# Patient Record
Sex: FEMALE
[De-identification: names, ages, dates, MRNs, and addresses within clinical notes are randomized per-mention and may not be internally consistent; named-entity substitution may affect disease eponyms.]

---

## 2018-07-21 ENCOUNTER — TRANSCRIPTION ENCOUNTER (OUTPATIENT)
Age: 43
End: 2018-07-21

## 2022-04-12 PROBLEM — Z00.00 ENCOUNTER FOR PREVENTIVE HEALTH EXAMINATION: Status: ACTIVE | Noted: 2022-04-12

## 2022-04-13 ENCOUNTER — NON-APPOINTMENT (OUTPATIENT)
Age: 47
End: 2022-04-13

## 2022-04-14 ENCOUNTER — APPOINTMENT (OUTPATIENT)
Dept: RHEUMATOLOGY | Facility: CLINIC | Age: 47
End: 2022-04-14
Payer: COMMERCIAL

## 2022-04-14 VITALS
WEIGHT: 109 LBS | HEART RATE: 55 BPM | TEMPERATURE: 95.8 F | DIASTOLIC BLOOD PRESSURE: 68 MMHG | SYSTOLIC BLOOD PRESSURE: 106 MMHG | OXYGEN SATURATION: 100 % | BODY MASS INDEX: 18.61 KG/M2 | HEIGHT: 64 IN

## 2022-04-14 DIAGNOSIS — M79.671 PAIN IN RIGHT FOOT: ICD-10-CM

## 2022-04-14 PROCEDURE — 36415 COLL VENOUS BLD VENIPUNCTURE: CPT

## 2022-04-14 PROCEDURE — 99203 OFFICE O/P NEW LOW 30 MIN: CPT | Mod: 25

## 2022-04-25 ENCOUNTER — NON-APPOINTMENT (OUTPATIENT)
Age: 47
End: 2022-04-25

## 2022-04-25 LAB
ALBUMIN SERPL ELPH-MCNC: 4.7 G/DL
ALP BLD-CCNC: 41 U/L
ALT SERPL-CCNC: 9 U/L
ANACR T: NEGATIVE
ANION GAP SERPL CALC-SCNC: 12 MMOL/L
AST SERPL-CCNC: 17 U/L
BASOPHILS # BLD AUTO: 0.03 K/UL
BASOPHILS NFR BLD AUTO: 1 %
BILIRUB SERPL-MCNC: 0.5 MG/DL
BUN SERPL-MCNC: 7 MG/DL
CALCIUM SERPL-MCNC: 9.2 MG/DL
CCP AB SER IA-ACNC: <8 UNITS
CHLORIDE SERPL-SCNC: 103 MMOL/L
CO2 SERPL-SCNC: 24 MMOL/L
CREAT SERPL-MCNC: 0.61 MG/DL
CRP SERPL-MCNC: <3 MG/L
EGFR: 111 ML/MIN/1.73M2
EOSINOPHIL # BLD AUTO: 0.01 K/UL
EOSINOPHIL NFR BLD AUTO: 0.3 %
ERYTHROCYTE [SEDIMENTATION RATE] IN BLOOD BY WESTERGREN METHOD: 2 MM/HR
GLUCOSE SERPL-MCNC: 110 MG/DL
HCT VFR BLD CALC: 35.6 %
HGB BLD-MCNC: 12 G/DL
HLA-B27 RELATED AG QL: NEGATIVE
IMM GRANULOCYTES NFR BLD AUTO: 0 %
LYMPHOCYTES # BLD AUTO: 1.06 K/UL
LYMPHOCYTES NFR BLD AUTO: 34.8 %
MAN DIFF?: NORMAL
MCHC RBC-ENTMCNC: 33.7 GM/DL
MCHC RBC-ENTMCNC: 34.5 PG
MCV RBC AUTO: 102.3 FL
MONOCYTES # BLD AUTO: 0.24 K/UL
MONOCYTES NFR BLD AUTO: 7.9 %
NEUTROPHILS # BLD AUTO: 1.71 K/UL
NEUTROPHILS NFR BLD AUTO: 56 %
PLATELET # BLD AUTO: 153 K/UL
POTASSIUM SERPL-SCNC: 4.9 MMOL/L
PROT SERPL-MCNC: 6.2 G/DL
RBC # BLD: 3.48 M/UL
RBC # FLD: 11.7 %
RF+CCP IGG SER-IMP: NEGATIVE
RHEUMATOID FACT SER QL: 14 IU/ML
SODIUM SERPL-SCNC: 139 MMOL/L
WBC # FLD AUTO: 3.05 K/UL

## 2022-04-25 NOTE — DATA REVIEWED
[FreeTextEntry1] : MRI reviewed:\par March 2022\par nondisplaced traumatic fracture in the 5th distal phalanx\par small ankle, subtalar, and talonavicular joint effusion\par small first MTP joint effusion, trace third and fourth MTP joint effusions\par Intermetatarsal bursitis of the first, second, and third webspaces

## 2022-04-25 NOTE — PHYSICAL EXAM
[Sclera] : the sclera and conjunctiva were normal [] : no respiratory distress [Respiration, Rhythm And Depth] : normal respiratory rhythm and effort [Exaggerated Use Of Accessory Muscles For Inspiration] : no accessory muscle use [Edema] : there was no peripheral edema [No Spinal Tenderness] : no spinal tenderness [Abnormal Walk] : normal gait [Nail Clubbing] : no clubbing  or cyanosis of the fingernails [Musculoskeletal - Swelling] : no joint swelling seen [Motor Tone] : muscle strength and tone were normal [Oriented To Time, Place, And Person] : oriented to person, place, and time [Impaired Insight] : insight and judgment were intact [Affect] : the affect was normal [FreeTextEntry1] : Pain with ROM of the right shoulder but with preserved ROM.

## 2022-04-25 NOTE — REVIEW OF SYSTEMS
[Joint Pain] : joint pain [Negative] : Heme/Lymph [FreeTextEntry9] : right arch of foot, right shoulder

## 2022-04-25 NOTE — ASSESSMENT
[FreeTextEntry1] : 46 year old woman referred for rheumatology evaluation.  Patient with chronic right foot pain, not improved with decrease in exercise or activity, not improved with course of steroids previously.  Will obtain labs today to evaluate for an underlying inflammatory arthritis, will check CBC, CMP, ESR, CRP, RF, CCP, HLA B27, and JOSÉ with reflex to serologies . Would recommend a course of antiinflammatories, aleve 440 mg bid for two weeks, follow up with podiatry, further management pending results.

## 2022-04-25 NOTE — HISTORY OF PRESENT ILLNESS
[FreeTextEntry1] : 46 year old woman referred for rheumatology evaluation\par \par Patient had fall on shoulder, had RTC surgery\par \par Pain in the foot returned\par Went to podiatrist, was prescribed orthotics which is not helping\par At the present, has not run in two years due to pain in the feet\par \par Arch of the foot hurts\par Getting out of bed is painful\par Wearing a brace which helps a little bit\par Ibuprofen helps, but tries not to take it, took for a week, 600 mg tid but took twice per day\par Helped a little but did not resolve\par Stopped ibuprofen last week \par Since stopping, feels the same\par Nothing make the pain better, rubbing does not help\par No injections previously \par \par Tried cortisone about 20 years ago which did  not help\par No exercise at present\par No other medical problems\par No family history of RA\par No history of psoriasis\par \par Right shoulder still acts up \par Right foot pain\par Left foot is ok\par No swelling in the right foot\par \par Gluten food, had endoscopy which was inconclusive, sister with celiac by biopsy\par Does not feel connection with pain and diet\par \par Takes vitamins and supplements\par Calcium and multivitamin\par \par History of anemia\par No iron supplementations\par Regular menses since decreased exercising\par Previously daily swimming and running\par \par No swollen joints\par No morning stiffness\par No history of psoriasis\par No history of uveitis\par No history of IBD
